# Patient Record
Sex: MALE | Race: WHITE
[De-identification: names, ages, dates, MRNs, and addresses within clinical notes are randomized per-mention and may not be internally consistent; named-entity substitution may affect disease eponyms.]

---

## 2019-06-23 ENCOUNTER — HOSPITAL ENCOUNTER (EMERGENCY)
Dept: HOSPITAL 25 - ED | Age: 44
Discharge: LEFT BEFORE DISCHARGE | End: 2019-06-23
Payer: COMMERCIAL

## 2019-06-23 ENCOUNTER — HOSPITAL ENCOUNTER (EMERGENCY)
Dept: HOSPITAL 25 - UCEAST | Age: 44
Discharge: HOME | End: 2019-06-23
Payer: COMMERCIAL

## 2019-06-23 VITALS — DIASTOLIC BLOOD PRESSURE: 87 MMHG | SYSTOLIC BLOOD PRESSURE: 126 MMHG

## 2019-06-23 VITALS — SYSTOLIC BLOOD PRESSURE: 115 MMHG | DIASTOLIC BLOOD PRESSURE: 84 MMHG
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
## 2019-06-23 DIAGNOSIS — Z88.0: ICD-10-CM

## 2019-06-23 DIAGNOSIS — Z53.21: Primary | ICD-10-CM
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
## 2019-06-23 DIAGNOSIS — S30.861A: ICD-10-CM

## 2019-06-23 PROCEDURE — 99281 EMR DPT VST MAYX REQ PHY/QHP: CPT

## 2019-06-23 PROCEDURE — G0463 HOSPITAL OUTPT CLINIC VISIT: HCPCS

## 2019-06-23 PROCEDURE — 99212 OFFICE O/P EST SF 10 MIN: CPT

## 2019-06-23 NOTE — UC
Skin Complaint HPI





- HPI Summary


HPI Summary: 





44 y/o male presents to the urgent care c/o tick on the left side of his mid 

abdomen.  He was hiking yesterday and this morning he was biking.  He noticed 

this evening, but he thinks it has been there for 1 day. No Hx of tick bite in 

the past. Pt denies fever, HA, joint pain, SOB, chest pain, abdominal pain, N/v/

d. Tick doesn't look engorged. 








- History of Current Complaint


Chief Complaint: UCSkin


Time Seen by Provider: 06/23/19 20:59


Stated Complaint: TICK


Hx Obtained From: Patient


Onset/Duration: Gradual Onset, Lasting Days - 1 day went hiking yesterday, 

Still Present, Worse Since - today when noticed


Skin Exposure Onset/Duration: Days Ago - 1 day


Timing: Constant


Onset Severity: Mild


Current Severity: Mild


Pain Intensity: 0


Pain Scale Used: 0-10 Numeric


Location: Discrete - left side ob mid abdomen w/ tick


Character: Redness


Aggravating Factor(s): Touch


Alleviating Factor(s): Nothing


Associated Signs & Symptoms: Positive: Rash - tick bite on left side of mid 

abdomen. tick still present and alive.  Negative: Fever, Chills


Related History: Possible Reaction to: Insect - tick





- Allergy/Home Medications


Allergies/Adverse Reactions: 


 Allergies











Allergy/AdvReac Type Severity Reaction Status Date / Time


 


Penicillins Allergy  Rash Verified 06/23/19 20:42














PMH/Surg Hx/FS Hx/Imm Hx


Previously Healthy: Yes - Pt denies pMHX





- Surgical History


Surgical History: None





- Family History


Known Family History: Positive: Hypertension





- Social History


Occupation: Employed Full-time


Lives: With Family


Alcohol Use: Daily


Substance Use Type: None


Smoking Status (MU): Never Smoked Tobacco





Review of Systems


All Other Systems Reviewed And Are Negative: Yes


Constitutional: Positive: Negative


Skin: Positive: Other - tick bite on left side of mid abdomen w/ tick still 

present


Eyes: Positive: Negative


ENT: Positive: Negative


Respiratory: Positive: Negative


Cardiovascular: Positive: Negative


Gastrointestinal: Positive: Negative


Genitourinary: Positive: Negative


Motor: Positive: Negative


Neurovascular: Positive: Negative


Musculoskeletal: Positive: Negative


Neurological: Positive: Negative


Psychological: Positive: Negative


Is Patient Immunocompromised?: No





Physical Exam





- Summary


Physical Exam Summary: 





Vital Signs Reviewed: Yes


General: well developed, well nourished male sitting in the examining table w/o 

any apparent distress.


Eyes: Positive: Conjunctiva Clear - PERRLA, EOMI


ENT: Positive: Normal ENT inspection, Hearing grossly normal, Pharynx normal, 

TMs normal


Neck: Positive: Supple, Nontender, No Lymphadenopathy


Respiratory: Positive: Chest nontender, Lungs clear, Normal breath sounds


Cardiovascular: Positive: RRR, No Murmur, Pulses Normal


Abdomen Description: Positive: Nontender, No Organomegaly, Soft.  Negative: CVA 

Tenderness (R), CVA Tenderness (L)


Bowel Sounds: Positive: Present


Musculoskeletal: Positive: Strength Intact, ROM Intact, No Edema


Neurological Exam: Normal


Psychological Exam: Normal


Skin: Positive: rashes - Left side of the mid abdomen with tick bite with 

surrounding erythema, non tender to palpation. tick still  present, no swelling 

or drainage observed





Triage Information Reviewed: Yes


Vital Signs: 


 Initial Vital Signs











Temp  98.4 F   06/23/19 20:38


 


Pulse  76   06/23/19 20:38


 


Resp  16   06/23/19 20:38


 


BP  115/84   06/23/19 20:38


 


Pulse Ox  99   06/23/19 20:38














Course/Dx





- Course


Course Of Treatment: 


44 y/o male presents to the urgent care c/o tick on the left side of his mid 

abdomen.  He was hiking yesterday and this morning he was biking.  He noticed 

this evening, but he thinks it has been there for 1 day. No Hx of tick bite in 

the past. Pt denies fever, HA, joint pain, SOB, chest pain, abdominal pain, N/v/

d. Tick doesn't look engorged. Hx obtained.  PT w/ Left side of the mid abdomen 

with tick bite with surrounding erythema, non tender to palpation. tick still  

present, no swelling or drainage observed on examination. Tick was removed from 

from left side of abdomen using  twister technique.  After tick removal and the 

skin cleansing and bacitracin oint applied.   Antibiotic prophylaxis with 

Doxycycline given to the patient to prevent lyme Disease..  Pt tolerated well 

medication. Pt advised to observe the area for the development or Erythema 

Migrans for upto 30 days following exposure. Advised if he develops fever or 

erythema Migrans to return to the clinic or PCP for further treatment .Pt 

understood and agreed with plan of care.








- Differential Diagnoses - Skin Complaint


Differential Diagnoses: Abscess, Cellulitis, Contact Dermatitis, MRSA, Tick 

Born Illness, Tinea, Urticaria





- Diagnoses


Provider Diagnosis: 


 Tick bite of abdominal wall








Discharge





- Sign-Out/Discharge


Documenting (check all that apply): Patient Departure - d/c home


All imaging exams completed and their final reports reviewed: No Studies





- Discharge Plan


Condition: Stable


Disposition: HOME


Prescriptions: 


Bacitracin OINTMENT* 1 applic TOPICAL BID #1 tube


Patient Education Materials:  Tick Bite (ED)


Referrals: 


[facility] [place] [facility] [Outside] - If Needed


Sabiha GOMEZ,Clive MORALES [Medical Doctor] - If Needed


Additional Instructions: 


1- Please observe the area for the development or Erythema Migrans for upto 30 

days following exposure. Components of the tick saliva can cause transient 

erythema that should not be confused with Erythema Migrans. If you develop the 

bull's eye rash, fever, joint pains please return to the urgent care or f/u 

with your PCP or Dr Landis  for further management. Apply Bacitracin oint 

around tick bite as directed 


2-Antibiotic prophylaxis with Doxycycline was given to you today to prevent 

lyme Disease. Lyme serology can be drawn in 2 weeks with your PCP to r/o Lyme 

disease since there is probability of negative results at early exposure.








- Billing Disposition and Condition


Condition: STABLE


Disposition: Home